# Patient Record
Sex: FEMALE | Race: WHITE | HISPANIC OR LATINO | Employment: FULL TIME | URBAN - METROPOLITAN AREA
[De-identification: names, ages, dates, MRNs, and addresses within clinical notes are randomized per-mention and may not be internally consistent; named-entity substitution may affect disease eponyms.]

---

## 2017-07-17 ENCOUNTER — OFFICE VISIT (OUTPATIENT)
Dept: URGENT CARE | Facility: CLINIC | Age: 32
End: 2017-07-17
Payer: COMMERCIAL

## 2017-07-17 VITALS
BODY MASS INDEX: 39.87 KG/M2 | DIASTOLIC BLOOD PRESSURE: 76 MMHG | HEIGHT: 63 IN | WEIGHT: 225 LBS | OXYGEN SATURATION: 97 % | HEART RATE: 80 BPM | TEMPERATURE: 98 F | SYSTOLIC BLOOD PRESSURE: 124 MMHG

## 2017-07-17 DIAGNOSIS — Z13.39 ALCOHOL SCREENING: ICD-10-CM

## 2017-07-17 DIAGNOSIS — S63.501A SPRAIN OF RIGHT WRIST, INITIAL ENCOUNTER: ICD-10-CM

## 2017-07-17 DIAGNOSIS — S63.641A SPRAIN OF METACARPOPHALANGEAL (MCP) JOINT OF RIGHT THUMB, INITIAL ENCOUNTER: ICD-10-CM

## 2017-07-17 DIAGNOSIS — Z02.83 ENCOUNTER FOR DRUG SCREENING: Primary | ICD-10-CM

## 2017-07-17 PROCEDURE — 82075 ASSAY OF BREATH ETHANOL: CPT | Mod: S$GLB,,, | Performed by: PREVENTIVE MEDICINE

## 2017-07-17 PROCEDURE — 99213 OFFICE O/P EST LOW 20 MIN: CPT | Mod: S$GLB,,, | Performed by: PREVENTIVE MEDICINE

## 2017-07-17 RX ORDER — MELOXICAM 7.5 MG/1
7.5 TABLET ORAL 2 TIMES DAILY WITH MEALS
Qty: 40 TABLET | Refills: 0 | Status: SHIPPED | OUTPATIENT
Start: 2017-07-17 | End: 2017-07-24 | Stop reason: ALTCHOICE

## 2017-07-17 NOTE — LETTER
[facility] [facility]  [address], [address]  [facility] [address]  Phone: [phone]  Fax: [phone]    Pt Name: Madalyn Graves  Injury Date: 7/17/2017   Employee ID: [id number] Date of First Treatment: 07/17/2017   Company: [employer]            Appointment Time: 04:05 PM Arrived:  4:20 PM CDT   Physician: Viral Clemons MD Check out: 5:42 PM           Office Treatment: Madalyn was seen today for arm pain and hand pain.    Diagnoses and all orders for this visit:    Encounter for drug screening  -     External Urine Drug Study Sendout    Alcohol screening  -     POCT alcohol breath test    Sprain of metacarpophalangeal (MCP) joint of right thumb, initial encounter    Sprain of right wrist, initial encounter    Other orders  -     meloxicam (MOBIC) 7.5 MG tablet; Take 1 tablet (7.5 mg total) by mouth 2 (two) times daily with meals.       Patient Instructions: Apply ice 24-48 hours then apply heat/warm soaks, Use splint as directed      WORK STATUS: No cutting, no mopping       Return Appointment: 7/24/2017 @ 3:00 pm

## 2017-07-17 NOTE — PROGRESS NOTES
Subjective:       Patient ID: Madalyn Graves is a 31 y.o. female.    Chief Complaint: Arm Pain (RIGHT) and Hand Pain (RIGHT)    New injury: doi/ft 7/17/2017: PT REPORTS A RECURRING RIGHT WRIST INJURY FROM LAST July. 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      Arm Pain      Hand Pain    The incident occurred more than 1 week ago. The incident occurred at work. The injury mechanism was repetitive motion. The pain is present in the right hand and right wrist. The pain is at a severity of 8/10.     ROS    Objective:      Physical Exam   Constitutional: She appears well-developed and well-nourished.   HENT:   Head: Normocephalic.   Neck: Normal range of motion.   Cardiovascular: Normal rate.    Musculoskeletal:        Right hand: She exhibits decreased range of motion and tenderness (Decreased range of motion about the right thumb with complaints of pain with flexion, extension  and abductions. No palpable nodules or swelling about the right thumb of wrist. ). She exhibits no swelling. Decreased strength noted. She exhibits thumb/finger opposition.   Neurological: She is alert.   Skin: Skin is warm.   Psychiatric: She has a normal mood and affect.       Assessment:       1. Encounter for drug screening    2. Alcohol screening    3. Sprain of metacarpophalangeal (MCP) joint of right thumb, initial encounter    4. Sprain of right wrist, initial encounter        Plan:            Patient Instructions: Apply ice 24-48 hours then apply heat/warm soaks, Use splint as directed        No cutting parts and no mopping at work

## 2017-07-24 ENCOUNTER — OFFICE VISIT (OUTPATIENT)
Dept: URGENT CARE | Facility: CLINIC | Age: 32
End: 2017-07-24
Payer: COMMERCIAL

## 2017-07-24 DIAGNOSIS — S63.501D SPRAIN OF RIGHT WRIST, SUBSEQUENT ENCOUNTER: Primary | ICD-10-CM

## 2017-07-24 DIAGNOSIS — S63.641D SPRAIN OF METACARPOPHALANGEAL (MCP) JOINT OF RIGHT THUMB, SUBSEQUENT ENCOUNTER: ICD-10-CM

## 2017-07-24 PROCEDURE — 99213 OFFICE O/P EST LOW 20 MIN: CPT | Mod: S$GLB,,, | Performed by: PREVENTIVE MEDICINE

## 2017-07-24 RX ORDER — ETODOLAC 400 MG/1
400 TABLET, FILM COATED ORAL 2 TIMES DAILY
Qty: 60 TABLET | Refills: 2 | Status: SHIPPED | OUTPATIENT
Start: 2017-07-24 | End: 2018-02-23

## 2017-07-24 RX ORDER — ETODOLAC 400 MG/1
400 TABLET, FILM COATED ORAL 2 TIMES DAILY
Qty: 60 TABLET | Refills: 2 | Status: SHIPPED | OUTPATIENT
Start: 2017-07-24 | End: 2017-07-24 | Stop reason: SDUPTHER

## 2017-07-24 NOTE — LETTER
[facility] [place]  [address], [address]  [place] [address]  Phone: [phone]  Fax: [phone]    Pt Name: Madalyn Graves  Injury Date: 7/17/2017   Employee ID:  Date: 07/24/2017   Company:[employer]            Appointment Time: 10:00 AM Arrived: 10:15 AM CDT   Physician: Viral Clemons MD Check out: 12:36 PM           Office Treatment: Madalyn was seen today for wrist pain and numbness.    Diagnoses and all orders for this visit:    Sprain of right wrist, subsequent encounter  -     X-Ray Wrist Complete Right; Future  -     Discontinue: etodolac (LODINE) 400 MG tablet; Take 1 tablet (400 mg total) by mouth 2 (two) times daily.  -     etodolac (LODINE) 400 MG tablet; Take 1 tablet (400 mg total) by mouth 2 (two) times daily.    Sprain of metacarpophalangeal (MCP) joint of right thumb, subsequent encounter       Patient Instructions: Daily home exercises/warm soaks, PT to be scheduled once authorized                                          WORK STATUS: LIGHT DUTY                                     No lifting/pushing/pulling more than 25 lbs            No cutting parts and limit use of right hand for lifting. Discontinue wrist splint         Return Appointment: 7/31/2017                                  @ 1:00 PM

## 2017-07-24 NOTE — ADDENDUM NOTE
Encounter addended by: Viral Clemons MD on: 7/24/2017  1:02 PM<BR>    Actions taken: Diagnosis association updated

## 2017-07-24 NOTE — PROGRESS NOTES
Subjective:       Patient ID: Madalyn Graves is a 31 y.o. female.    Chief Complaint: Wrist Pain (RIGHT) and Numbness    F/U (DOI 7/17/2017) PT RETURNS C/O CONSTANT RIGHT WRIST PAIN INCLUDING THE THUMB WITH HAND AND FINGER NUMBNESS.        Wrist Pain    The pain is present in the right wrist, right hand and right fingers. The problem occurs constantly. The problem has been unchanged. The quality of the pain is described as aching. The pain is at a severity of 9/10. The pain is moderate. Associated symptoms include numbness. Pertinent negatives include no fever, itching or stiffness. There is no history of gout.     Review of Systems   Constitution: Negative for chills, fever and weakness.   HENT: Negative for congestion, ear pain, headaches and nosebleeds.    Eyes: Negative for blurred vision and pain.   Cardiovascular: Negative for chest pain and palpitations.   Respiratory: Negative for cough, shortness of breath and wheezing.    Skin: Negative for dry skin, itching and rash.   Musculoskeletal: Positive for joint pain. Negative for arthritis, back pain, gout, joint swelling, muscle weakness, neck pain and stiffness.   Gastrointestinal: Negative for abdominal pain, constipation, diarrhea, nausea and vomiting.   Genitourinary: Negative for dysuria, frequency and hematuria.   Neurological: Positive for numbness. Negative for dizziness and seizures.   Allergic/Immunologic: Negative for hives.       Objective:      Physical Exam   Constitutional: She appears well-developed and well-nourished.   HENT:   Head: Normocephalic.   Eyes: EOM are normal.   Neck: Normal range of motion.   Cardiovascular: Normal rate.    Pulmonary/Chest: Effort normal.   Musculoskeletal:        Right wrist: She exhibits decreased range of motion and tenderness (patient complains of pain with palpation of dorsal aspect of right wrist. She has pain with flexion, extension and rotation of right wrist.  Phalen's and Tinnel's sign negative. ).    Neurological: She is alert.   Skin: Skin is warm.   Psychiatric: She has a normal mood and affect.       Assessment:       1. Sprain of right wrist, subsequent encounter    2. Sprain of metacarpophalangeal (MCP) joint of right thumb, subsequent encounter        Plan:           Medications Ordered This Encounter      etodolac (LODINE) 400 MG tablet          Sig: Take 1 tablet (400 mg total) by mouth 2 (two) times daily.          Dispense:  60 tablet          Refill:  2  Patient Instructions: Daily home exercises/warm soaks, PT to be scheduled once authorized   Restrictions: No lifting/pushing/pulling more than 25 lbs    No cutting parts and limit use of right hand for lifting. Discontinue wrist splint

## 2017-07-27 LAB — POC BREATH ALCOHOL: NEGATIVE

## 2017-07-31 ENCOUNTER — OFFICE VISIT (OUTPATIENT)
Dept: URGENT CARE | Facility: CLINIC | Age: 32
End: 2017-07-31
Payer: COMMERCIAL

## 2017-07-31 DIAGNOSIS — S63.501D SPRAIN OF RIGHT WRIST, SUBSEQUENT ENCOUNTER: Primary | ICD-10-CM

## 2017-07-31 DIAGNOSIS — S63.641D SPRAIN OF METACARPOPHALANGEAL (MCP) JOINT OF RIGHT THUMB, SUBSEQUENT ENCOUNTER: ICD-10-CM

## 2017-07-31 PROCEDURE — 99213 OFFICE O/P EST LOW 20 MIN: CPT | Mod: S$GLB,,, | Performed by: PREVENTIVE MEDICINE

## 2017-07-31 NOTE — ADDENDUM NOTE
Encounter addended by: Jorje Castillo MA on: 7/31/2017  2:14 PM<BR>    Actions taken: Letter status changed

## 2017-07-31 NOTE — LETTER
[facility]  [address], [address]  [facility] [address]  Phone: [phone]  Fax: [phone]    Pt Name: Madalyn Graves  Injury Date: 7/17/2017   Employee ID:  Today's Treatment: 07/31/2017   Company: [employer]            Appointment Time: 12:45 PM Arrived:  1:00 PM CDT   Physician: Viral Clemons MD Time Out: 13:55 pm           Office Treatment: Madalyn was seen today for wrist pain.    Diagnoses and all orders for this visit:    Sprain of right wrist, subsequent encounter    Sprain of metacarpophalangeal (MCP) joint of right thumb, subsequent encounter       Patient Instructions: Daily home exercises/warm soaks    Restrictions: No lifting/pushing/pulling more than 25 lbs (no cutting parts and limit use of right hand for lifiting.  Avoid work building in [place])       Return Appointment: 8/7/2017 at 1300 PM

## 2017-07-31 NOTE — LETTER
[facility] [place]  [address], [address]  [place] [address]  Phone: [phone]  Fax: [phone]    Pt Name: Madalyn Graves  Injury Date: 717/2017   Employee ID:  Datet: 7/31/2017   Company: [id number]            Appointment Time: 12:45 PM Arrived:  1:00 PM CDT   Physician: Viral Clemons MD            Office Treatment: Madalyn was seen today for wrist pain.    Diagnoses and all orders for this visit:    Sprain of right wrist, subsequent encounter    Sprain of metacarpophalangeal (MCP) joint of right thumb, subsequent encounter       Patient Instructions: Daily home exercises/warm soaks                                         WORK STATUS: LIGHT DUTY                             No lifting/pushing/pulling more than 25 lbs                       no cutting parts and limit use of right hand for lifiting.                                       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

## 2017-07-31 NOTE — PROGRESS NOTES
Subjective:       Patient ID: Madalyn Graves is a 31 y.o. female.    Chief Complaint: Wrist Pain    Follow up: Pt comes into today w/ pain in r/wrist from work related injury. Pt states that she has some swelling, numbness and tingling w/movement. cml      Wrist Pain    The pain is present in the right wrist. The current episode started in the past 7 days. The problem occurs constantly. The problem has been unchanged. The quality of the pain is described as aching. The pain is at a severity of 8/10. The pain is mild. Associated symptoms include joint swelling, numbness and tingling. The symptoms are aggravated by cold. She has tried NSAIDS and acetaminophen for the symptoms. The treatment provided no relief.     Review of Systems   Musculoskeletal: Positive for joint pain and joint swelling.   Neurological: Positive for numbness and tingling.   All other systems reviewed and are negative.      Objective:      Physical Exam   Constitutional: She appears well-developed and well-nourished.   HENT:   Head: Normocephalic.   Eyes: Pupils are equal, round, and reactive to light.   Neck: Normal range of motion.   Cardiovascular: Normal rate.    Pulmonary/Chest: Effort normal.   Musculoskeletal:        Right wrist: She exhibits decreased range of motion, tenderness and swelling. She exhibits no effusion, no crepitus, no deformity and no laceration.        Lumbar back: She exhibits no bony tenderness, no swelling, no edema, no deformity, no laceration, no spasm and normal pulse.        Arms:  Neurological: She is alert.   No focal neurologic deficits   Skin: Skin is warm.   Psychiatric: She has a normal mood and affect.   Nursing note and vitals reviewed.      Assessment:       1. Sprain of right wrist, subsequent encounter    2. Sprain of metacarpophalangeal (MCP) joint of right thumb, subsequent encounter        Plan:            Patient Instructions: Daily home exercises/warm soaks   Restrictions: No  lifting/pushing/pulling more than 25 lbs (no cutting parts and limit use of right hand for lifiting.  Avoid work building in [place])    Continue Etodolac 400mg Twice per day.

## 2017-08-07 ENCOUNTER — OFFICE VISIT (OUTPATIENT)
Dept: URGENT CARE | Facility: CLINIC | Age: 32
End: 2017-08-07
Payer: COMMERCIAL

## 2017-08-07 DIAGNOSIS — S63.501D SPRAIN OF RIGHT WRIST, SUBSEQUENT ENCOUNTER: Primary | ICD-10-CM

## 2017-08-07 DIAGNOSIS — S63.641D SPRAIN OF METACARPOPHALANGEAL (MCP) JOINT OF RIGHT THUMB, SUBSEQUENT ENCOUNTER: ICD-10-CM

## 2017-08-07 PROCEDURE — 3008F BODY MASS INDEX DOCD: CPT | Mod: S$GLB,,, | Performed by: PREVENTIVE MEDICINE

## 2017-08-07 PROCEDURE — 99213 OFFICE O/P EST LOW 20 MIN: CPT | Mod: S$GLB,,, | Performed by: PREVENTIVE MEDICINE

## 2017-08-07 NOTE — PROGRESS NOTES
Subjective:       Patient ID: Madalyn Graves is a 31 y.o. female.    Chief Complaint: Wrist Pain (RIGHT)    F/U RIGHT HAND WRIST PAIN: (DOI 7/17/17) PT STATES THAT HER PAIN IS A LITTLE BETTER. SHE CONTINUES TO C/O PAIN IN RIGHT THUMB/ HAND AREA.       Wrist Pain    The pain is present in the right hand and right wrist. The current episode started more than 1 month ago. The problem occurs constantly. The problem has been gradually improving. The quality of the pain is described as aching. The pain is at a severity of 5/10. The pain is mild. Pertinent negatives include no fever, itching, numbness or stiffness. The symptoms are aggravated by activity. She has tried acetaminophen and NSAIDS for the symptoms. The treatment provided mild relief. There is no history of gout.     Review of Systems   Constitution: Negative for chills, fever and weakness.   HENT: Negative for congestion, ear pain, headaches and nosebleeds.    Eyes: Negative for blurred vision and pain.   Cardiovascular: Negative for chest pain and palpitations.   Respiratory: Negative for cough, shortness of breath and wheezing.    Skin: Negative for dry skin, itching and rash.   Musculoskeletal: Positive for joint pain. Negative for arthritis, back pain, gout, joint swelling, muscle weakness, neck pain and stiffness.   Gastrointestinal: Negative for abdominal pain, constipation, diarrhea, nausea and vomiting.   Genitourinary: Negative for dysuria, frequency and hematuria.   Neurological: Negative for dizziness, numbness and seizures.   Allergic/Immunologic: Negative for hives.   All other systems reviewed and are negative.      Objective:      Physical Exam   Constitutional: She appears well-developed and well-nourished.   HENT:   Head: Normocephalic.   Eyes: Pupils are equal, round, and reactive to light.   Neck: Normal range of motion.   Cardiovascular: Normal rate.    Pulmonary/Chest: Effort normal.   Musculoskeletal:        Lumbar back: She  exhibits no bony tenderness, no swelling, no edema, no deformity, no laceration, no spasm and normal pulse.        Right hand: She exhibits decreased range of motion, tenderness and swelling. She exhibits no bony tenderness, normal capillary refill, no deformity and no laceration.        Hands:  Neurological: She is alert.   No focal neurologic deficits   Skin: Skin is warm.   Psychiatric: She has a normal mood and affect.   Nursing note and vitals reviewed.      Assessment:       1. Sprain of right wrist, subsequent encounter    2. Sprain of metacarpophalangeal (MCP) joint of right thumb, subsequent encounter        Plan:            Patient Instructions: Daily home exercises/warm soaks   Restrictions: No lifting/pushing/pulling more than 25 lbs (No cutting parts and limit use of right hand. Avoid using right hand to pinch in [place] area.  May work in Re-work areas.)

## 2017-08-07 NOTE — LETTER
[facility] [place]  [address], [address]  [facility] [address]  Phone: [phone]  Fax: [phone]    Pt Name: Madalyn Graves  Injury Date:    Employee ID:  Date 08/07/2017   Company: [employer]            Appointment Time: 01:15 PM Arrived:  1:30 PM CDT   Physician: Viral Clemons MD Check out: 3:01 PM           Office Treatment: Madalyn was seen today for wrist pain.    Diagnoses and all orders for this visit:    Sprain of right wrist, subsequent encounter    Sprain of metacarpophalangeal (MCP) joint of right thumb, subsequent encounter       Patient Instructions: Daily home exercises/warm soaks                                    WORK STATUS: LIGHT DUTY                              No lifting/pushing/pulling more than 25 lbs                               No cutting parts and limit use of right hand.                                Avoid using right hand to pinch in [place] area.                                              May work in Re-work areas.       Return Appointment: 8/14/2017 @ 11:30 AM

## 2017-08-14 ENCOUNTER — OFFICE VISIT (OUTPATIENT)
Dept: URGENT CARE | Facility: CLINIC | Age: 32
End: 2017-08-14
Payer: COMMERCIAL

## 2017-08-14 DIAGNOSIS — S63.501D SPRAIN OF RIGHT WRIST, SUBSEQUENT ENCOUNTER: Primary | ICD-10-CM

## 2017-08-14 DIAGNOSIS — S63.641D SPRAIN OF METACARPOPHALANGEAL (MCP) JOINT OF RIGHT THUMB, SUBSEQUENT ENCOUNTER: ICD-10-CM

## 2017-08-14 PROCEDURE — 3008F BODY MASS INDEX DOCD: CPT | Mod: S$GLB,,, | Performed by: PREVENTIVE MEDICINE

## 2017-08-14 PROCEDURE — 99213 OFFICE O/P EST LOW 20 MIN: CPT | Mod: S$GLB,,, | Performed by: PREVENTIVE MEDICINE

## 2017-08-14 NOTE — PROGRESS NOTES
Subjective:       Patient ID: Madalyn Graves is a 31 y.o. female.    Chief Complaint: Wrist Pain (RIGHT)    F/U RIGHT WRIST/HAND PAIN (DOI 7/17/2017)       Wrist Pain    The pain is present in the right wrist and right hand. The current episode started more than 1 month ago. The problem occurs constantly. The problem has been unchanged. The quality of the pain is described as aching. The pain is at a severity of 5/10. Pertinent negatives include no fever, itching, numbness or stiffness. The symptoms are aggravated by activity. There is no history of gout.     Review of Systems   Constitution: Negative for chills, fever and weakness.   HENT: Negative for congestion, ear pain, headaches and nosebleeds.    Eyes: Negative for blurred vision and pain.   Cardiovascular: Negative for chest pain and palpitations.   Respiratory: Negative for cough, shortness of breath and wheezing.    Skin: Negative for dry skin, itching and rash.   Musculoskeletal: Positive for joint pain and joint swelling. Negative for arthritis, back pain, gout, muscle weakness, neck pain and stiffness.   Gastrointestinal: Negative for abdominal pain, constipation, diarrhea, nausea and vomiting.   Genitourinary: Negative for dysuria, frequency and hematuria.   Neurological: Negative for dizziness, numbness and seizures.   Allergic/Immunologic: Negative for hives.   All other systems reviewed and are negative.      Objective:      Physical Exam   Constitutional: She appears well-developed and well-nourished.   HENT:   Head: Normocephalic.   Eyes: Pupils are equal, round, and reactive to light.   Neck: Normal range of motion.   Cardiovascular: Normal rate.    Pulmonary/Chest: Effort normal.   Musculoskeletal:        Lumbar back: She exhibits no bony tenderness, no swelling, no edema, no deformity, no laceration, no spasm and normal pulse.        Right hand: She exhibits decreased range of motion and tenderness. She exhibits no bony tenderness, normal  capillary refill, no deformity, no laceration and no swelling.        Hands:  Neurological: She is alert.   No focal neurologic deficits   Skin: Skin is warm.   Psychiatric: She has a normal mood and affect.   Nursing note and vitals reviewed.      Assessment:       1. Sprain of right wrist, subsequent encounter    2. Sprain of metacarpophalangeal (MCP) joint of right thumb, subsequent encounter        Plan:            Patient Instructions: Daily home exercises/warm soaks   Restrictions: No lifting/pushing/pulling more than 25 lbs (No cutting parts an limit use of right hand. Avoid using right hand to pinch in [place] area. May work in Re-work areas)

## 2017-08-28 ENCOUNTER — OFFICE VISIT (OUTPATIENT)
Dept: URGENT CARE | Facility: CLINIC | Age: 32
End: 2017-08-28
Payer: COMMERCIAL

## 2017-08-28 DIAGNOSIS — S63.641D SPRAIN OF METACARPOPHALANGEAL (MCP) JOINT OF RIGHT THUMB, SUBSEQUENT ENCOUNTER: ICD-10-CM

## 2017-08-28 DIAGNOSIS — S63.501D SPRAIN OF RIGHT WRIST, SUBSEQUENT ENCOUNTER: Primary | ICD-10-CM

## 2017-08-28 PROCEDURE — 99213 OFFICE O/P EST LOW 20 MIN: CPT | Mod: S$GLB,,, | Performed by: PREVENTIVE MEDICINE

## 2017-08-28 PROCEDURE — 3008F BODY MASS INDEX DOCD: CPT | Mod: S$GLB,,, | Performed by: PREVENTIVE MEDICINE

## 2017-08-28 NOTE — PROGRESS NOTES
Subjective:       Patient ID: Madalyn Graves is a 31 y.o. female.    Chief Complaint: Wrist Pain (right)    Pt presents for f/u on right wrist/hand pain (doi 7/17/2017)      Wrist Pain    The pain is present in the right hand and right wrist. The current episode started more than 1 month ago. The problem occurs intermittently. The problem has been gradually improving. The quality of the pain is described as aching. The pain is at a severity of 2/10. The pain is mild. Pertinent negatives include no fever, itching, numbness or stiffness. There is no history of gout.     Review of Systems   Constitution: Negative for chills, fever and weakness.   HENT: Negative for congestion, ear pain, headaches and nosebleeds.    Eyes: Negative for blurred vision and pain.   Cardiovascular: Negative for chest pain and palpitations.   Respiratory: Negative for cough, shortness of breath and wheezing.    Skin: Negative for dry skin, itching and rash.   Musculoskeletal: Positive for joint pain and joint swelling. Negative for arthritis, back pain, gout, muscle weakness, neck pain and stiffness.   Gastrointestinal: Negative for abdominal pain, constipation, diarrhea, nausea and vomiting.   Genitourinary: Negative for dysuria, frequency and hematuria.   Neurological: Negative for dizziness, numbness and seizures.   Allergic/Immunologic: Negative for hives.   All other systems reviewed and are negative.      Objective:      Physical Exam   Constitutional: She appears well-developed and well-nourished.   HENT:   Head: Normocephalic.   Eyes: Pupils are equal, round, and reactive to light.   Neck: Normal range of motion.   Cardiovascular: Normal rate.    Pulmonary/Chest: Effort normal.   Musculoskeletal:        Lumbar back: She exhibits no bony tenderness, no swelling, no edema, no deformity, no laceration, no spasm and normal pulse.        Right hand: She exhibits normal range of motion, no tenderness, no bony tenderness, normal  capillary refill, no deformity, no laceration and no swelling.        Hands:  Neurological: She is alert.   No focal neurologic deficits   Skin: Skin is warm.   Psychiatric: She has a normal mood and affect.   Nursing note and vitals reviewed.      Assessment:       1. Sprain of right wrist, subsequent encounter    2. Sprain of metacarpophalangeal (MCP) joint of right thumb, subsequent encounter        Plan:            Patient Instructions: Daily home exercises/warm soaks   Restrictions: Regular Duty (Permanent restriction of no Cutting)      Return if symptoms worsen or fail to improve.

## 2017-09-18 ENCOUNTER — OFFICE VISIT (OUTPATIENT)
Dept: URGENT CARE | Facility: CLINIC | Age: 32
End: 2017-09-18
Payer: COMMERCIAL

## 2017-09-18 DIAGNOSIS — S63.641D SPRAIN OF METACARPOPHALANGEAL (MCP) JOINT OF RIGHT THUMB, SUBSEQUENT ENCOUNTER: ICD-10-CM

## 2017-09-18 DIAGNOSIS — S63.501D SPRAIN OF RIGHT WRIST, SUBSEQUENT ENCOUNTER: Primary | ICD-10-CM

## 2017-09-18 PROCEDURE — 3008F BODY MASS INDEX DOCD: CPT | Mod: S$GLB,,, | Performed by: PREVENTIVE MEDICINE

## 2017-09-18 PROCEDURE — 99213 OFFICE O/P EST LOW 20 MIN: CPT | Mod: S$GLB,,, | Performed by: PREVENTIVE MEDICINE

## 2017-09-18 NOTE — LETTER
[facility] [place]  [address]  [facility] [facility] [address]  Phone: [phone]  Fax: [phone]    Pt Name: Madalyn Graves  Injury Date: 7/17/2017   Employee ID:  Date 09/18/2017   Company:[employer]            Appointment Time: 11:15 AM Arrived: 11:15 AM CDT   Physician: Viral Clemons MD cHECK OUT: 12:58 pm           Office Treatment: Madalyn was seen today for wrist pain.    Diagnoses and all orders for this visit:    Sprain of right wrist, subsequent encounter    Sprain of metacarpophalangeal (MCP) joint of right thumb, subsequent encounter       Patient Instructions: Daily home exercises/warm soaks    Restrictions: Regular Duty  Patient discharged from [facility].

## 2017-09-18 NOTE — PROGRESS NOTES
Subjective:       Patient ID: Madalyn Graves is a 31 y.o. female.    Chief Complaint: Wrist Pain (right)    [employer] employee treated for right wrist injury (7/17/2017), and released with a permanent job restriction 8/28. Per employer request, pt presents to discuss her job responsibilities as pertains to the MMI release to duty. Pt continues to have right wrist pain with gripping.  ajd      Wrist Pain    The pain is present in the right hand. The current episode started more than 1 month ago. The problem occurs intermittently. The quality of the pain is described as aching. The pain is mild. Pertinent negatives include no fever, itching, numbness or stiffness. There is no history of gout.     Review of Systems   Constitution: Negative for chills, fever and weakness.   HENT: Negative for congestion, ear pain and nosebleeds.    Eyes: Negative for blurred vision and pain.   Cardiovascular: Negative for chest pain and palpitations.   Respiratory: Negative for cough, shortness of breath and wheezing.    Skin: Negative for dry skin, itching and rash.   Musculoskeletal: Positive for joint pain. Negative for arthritis, back pain, gout, joint swelling, muscle weakness, neck pain and stiffness.   Gastrointestinal: Negative for abdominal pain, constipation, diarrhea, nausea and vomiting.   Genitourinary: Negative for dysuria, frequency and hematuria.   Neurological: Negative for dizziness, headaches, numbness and seizures.   Allergic/Immunologic: Negative for hives.   All other systems reviewed and are negative.      Objective:      Physical Exam   Constitutional: She appears well-developed and well-nourished.   HENT:   Head: Normocephalic.   Eyes: Pupils are equal, round, and reactive to light.   Neck: Normal range of motion.   Cardiovascular: Normal rate.    Pulmonary/Chest: Effort normal.   Musculoskeletal:        Right wrist: She exhibits normal range of motion, no tenderness, no bony tenderness, no swelling, no  effusion, no crepitus, no deformity and no laceration.        Lumbar back: She exhibits no bony tenderness, no swelling, no edema, no deformity, no laceration, no spasm and normal pulse.   Neurological: She is alert.   No focal neurologic deficits   Skin: Skin is warm.   Psychiatric: She has a normal mood and affect.   Nursing note and vitals reviewed.      Assessment:       1. Sprain of right wrist, subsequent encounter    2. Sprain of metacarpophalangeal (MCP) joint of right thumb, subsequent encounter        Plan:            Patient Instructions: Daily home exercises/warm soaks   Restrictions: Regular Duty  Return if symptoms worsen or fail to improve.

## 2018-02-23 ENCOUNTER — OFFICE VISIT (OUTPATIENT)
Dept: FAMILY MEDICINE | Facility: CLINIC | Age: 33
End: 2018-02-23
Payer: COMMERCIAL

## 2018-02-23 VITALS
SYSTOLIC BLOOD PRESSURE: 116 MMHG | HEIGHT: 63 IN | RESPIRATION RATE: 12 BRPM | DIASTOLIC BLOOD PRESSURE: 78 MMHG | BODY MASS INDEX: 39.16 KG/M2 | HEART RATE: 85 BPM | OXYGEN SATURATION: 97 % | WEIGHT: 221 LBS | TEMPERATURE: 98 F

## 2018-02-23 DIAGNOSIS — R73.03 PREDIABETES: Primary | ICD-10-CM

## 2018-02-23 DIAGNOSIS — Z23 NEED FOR VACCINATION: ICD-10-CM

## 2018-02-23 DIAGNOSIS — E66.9 OBESITY, CLASS II, BMI 35-39.9: ICD-10-CM

## 2018-02-23 DIAGNOSIS — E66.01 MORBID OBESITY: ICD-10-CM

## 2018-02-23 PROCEDURE — 90471 IMMUNIZATION ADMIN: CPT | Mod: S$GLB,,, | Performed by: FAMILY MEDICINE

## 2018-02-23 PROCEDURE — 90715 TDAP VACCINE 7 YRS/> IM: CPT | Mod: S$GLB,,, | Performed by: FAMILY MEDICINE

## 2018-02-23 PROCEDURE — 99203 OFFICE O/P NEW LOW 30 MIN: CPT | Mod: 25,S$GLB,, | Performed by: FAMILY MEDICINE

## 2018-02-23 PROCEDURE — 99999 PR PBB SHADOW E&M-EST. PATIENT-LVL III: CPT | Mod: PBBFAC,,, | Performed by: FAMILY MEDICINE

## 2018-02-23 PROCEDURE — 3008F BODY MASS INDEX DOCD: CPT | Mod: S$GLB,,, | Performed by: FAMILY MEDICINE

## 2018-02-23 RX ORDER — METFORMIN HYDROCHLORIDE 500 MG/1
500 TABLET ORAL 2 TIMES DAILY WITH MEALS
Qty: 60 TABLET | Refills: 2 | Status: SHIPPED | OUTPATIENT
Start: 2018-02-23 | End: 2018-04-06 | Stop reason: ALTCHOICE

## 2018-02-23 NOTE — PROGRESS NOTES
"Subjective:       Patient ID: Madalyn Graves is a 32 y.o. female.    Chief Complaint: Establish Care    HPI   32 year old female comes in for review of abnormal labs and establish care for prediabetes. She had fasting labs done earlier today showing a glucose of 115, Tchol of 167, LDL of 103, and HDL31, with TAG of 164. Patient is overweight.  No other medical problems reported.     Review of Systems   Constitutional: Negative for unexpected weight change.   HENT: Negative for ear pain and sore throat.    Eyes: Negative for visual disturbance.   Respiratory: Negative for shortness of breath.    Cardiovascular: Negative for chest pain.   Gastrointestinal: Negative for abdominal pain and blood in stool.   Endocrine: Negative for cold intolerance and heat intolerance.   Genitourinary: Negative for dysuria and frequency.   Skin: Negative for rash.   Neurological: Negative for weakness, numbness and headaches.   Hematological: Negative for adenopathy.   Psychiatric/Behavioral: Negative for suicidal ideas.       Objective:     /78 (BP Location: Right arm, Patient Position: Sitting, BP Method: Medium (Manual))   Pulse 85   Temp 98.1 °F (36.7 °C) (Oral)   Resp 12   Ht 5' 3" (1.6 m)   Wt 100.2 kg (221 lb)   LMP 01/23/2018 (Approximate) Comment: BTL  SpO2 97%   BMI 39.15 kg/m²     Physical Exam   Constitutional: She is oriented to person, place, and time.   HENT:   Head: Normocephalic and atraumatic.   Right Ear: Tympanic membrane, external ear and ear canal normal.   Left Ear: Tympanic membrane, external ear and ear canal normal.   Nose: Nose normal.   Mouth/Throat: Oropharynx is clear and moist.   Eyes: Conjunctivae and EOM are normal. Pupils are equal, round, and reactive to light.   Neck: Normal range of motion. Neck supple.   Cardiovascular: Normal rate, regular rhythm, normal heart sounds and intact distal pulses.    Pulmonary/Chest: Effort normal and breath sounds normal.   Abdominal: Soft. Bowel " sounds are normal.   Neurological: She is alert and oriented to person, place, and time. No cranial nerve deficit.   Psychiatric: She has a normal mood and affect.   Vitals reviewed.      Assessment:       1. Prediabetes    2. Need for vaccination    3. Need for Tdap vaccination    4. Morbid obesity    5. Obesity, Class II, BMI 35-39.9    6. BMI 39.0-39.9,adult        Plan:     Madalyn was seen today for establish care.    Diagnoses and all orders for this visit:    Prediabetes  -     metFORMIN (GLUCOPHAGE) 500 MG tablet; Take 1 tablet (500 mg total) by mouth 2 (two) times daily with meals.  -     Hemoglobin A1c; Future  Discussed implications of prediabetes and risks of developing diabetes.  Dietary changes discussed.  Risks, benefits and side effects of metformin discussed and patient agrees to start.  Patient will work on weight.  Follow up in 6 weeks.     Need for vaccination  -     (In Office Administered) Tdap Vaccine    Morbid obesity    Obesity, Class II, BMI 35-39.9    BMI 39.0-39.9,adult

## 2018-02-23 NOTE — PROGRESS NOTES
Tdap was given to the pt as ordered by the MD. The patient tolerated well, I advised the patient to wait 15 minuets to observe for any vaccine reactions. The pt. Expressed an understanding.

## 2018-02-28 ENCOUNTER — LAB VISIT (OUTPATIENT)
Dept: LAB | Facility: HOSPITAL | Age: 33
End: 2018-02-28
Attending: FAMILY MEDICINE
Payer: COMMERCIAL

## 2018-02-28 DIAGNOSIS — R73.03 PREDIABETES: ICD-10-CM

## 2018-02-28 LAB
ESTIMATED AVG GLUCOSE: 111 MG/DL
HBA1C MFR BLD HPLC: 5.5 %

## 2018-02-28 PROCEDURE — 36415 COLL VENOUS BLD VENIPUNCTURE: CPT | Mod: PN

## 2018-02-28 PROCEDURE — 83036 HEMOGLOBIN GLYCOSYLATED A1C: CPT

## 2018-04-06 ENCOUNTER — OFFICE VISIT (OUTPATIENT)
Dept: FAMILY MEDICINE | Facility: CLINIC | Age: 33
End: 2018-04-06
Payer: COMMERCIAL

## 2018-04-06 VITALS
WEIGHT: 216.94 LBS | BODY MASS INDEX: 38.44 KG/M2 | HEIGHT: 63 IN | HEART RATE: 84 BPM | OXYGEN SATURATION: 96 % | SYSTOLIC BLOOD PRESSURE: 118 MMHG | TEMPERATURE: 98 F | DIASTOLIC BLOOD PRESSURE: 80 MMHG

## 2018-04-06 DIAGNOSIS — R73.03 PREDIABETES: Primary | ICD-10-CM

## 2018-04-06 DIAGNOSIS — E66.9 OBESITY (BMI 35.0-39.9 WITHOUT COMORBIDITY): ICD-10-CM

## 2018-04-06 PROCEDURE — 99214 OFFICE O/P EST MOD 30 MIN: CPT | Mod: S$GLB,,, | Performed by: FAMILY MEDICINE

## 2018-04-06 PROCEDURE — 99999 PR PBB SHADOW E&M-EST. PATIENT-LVL III: CPT | Mod: PBBFAC,,, | Performed by: FAMILY MEDICINE

## 2018-04-07 NOTE — PROGRESS NOTES
Routine Office Visit    Patient Name: Madalyn Graves    : 1985  MRN: [id number]    Subjective:  Madalyn is a 32 y.o. female who presents today for:   Chief Complaint   Patient presents with    PreDM       32 year old female presents For follow up on prediabetes. She reports that she's taking the metformin at night time. She reports that he gives her diarrhea.She continues taking despite the side effect. She's also decreasing her carb intake. She has stopped drinking soda. She is also exercising more. She's trying to lose weight and since her last visit has lost 5 pounds.     Past Medical History  Past Medical History:   Diagnosis Date    Asthma        Past Surgical History  Past Surgical History:   Procedure Laterality Date     SECTION      X2    TUBAL LIGATION         Family History  Family History   Problem Relation Age of Onset    Diabetes Mother     Diabetes Father     Breast cancer Neg Hx     Colon cancer Neg Hx     Ovarian cancer Neg Hx        Social History  Social History     Social History    Marital status:      Spouse name: N/A    Number of children: N/A    Years of education: N/A     Occupational History    Not on file.     Social History Main Topics    Smoking status: Current Some Day Smoker    Smokeless tobacco: Never Used      Comment: maybe 2-3 cigarettes a day    Alcohol use Yes      Comment: socilally    Drug use: No    Sexual activity: Not on file     Other Topics Concern    Not on file     Social History Narrative    No narrative on file       Current Medications  No current outpatient prescriptions on file prior to visit.     No current facility-administered medications on file prior to visit.        Allergies   Review of patient's allergies indicates:   Allergen Reactions    Penicillins Other (See Comments)     Passed out       Review of Systems   Respiratory: Negative for shortness of breath.    Cardiovascular: Negative for chest pain.  "  Gastrointestinal: Negative for abdominal pain and blood in stool.   Endocrine: Negative for cold intolerance, heat intolerance, polydipsia, polyphagia and polyuria.   Genitourinary: Negative for dysuria and frequency.   Skin: Negative for rash.   Neurological: Negative for weakness, numbness and headaches.   Hematological: Negative for adenopathy.   Psychiatric/Behavioral: Negative for suicidal ideas.       /80   Pulse 84   Temp 98.4 °F (36.9 °C) (Oral)   Ht 5' 3" (1.6 m)   Wt 98.4 kg (216 lb 14.9 oz)   LMP 03/20/2018   SpO2 96%   BMI 38.43 kg/m²     Physical Exam   Constitutional: She is oriented to person, place, and time. She appears well-developed and well-nourished. No distress.   HENT:   Head: Normocephalic and atraumatic.   Right Ear: Tympanic membrane, external ear and ear canal normal.   Left Ear: Tympanic membrane, external ear and ear canal normal.   Nose: Nose normal.   Mouth/Throat: Oropharynx is clear and moist.   Eyes: Conjunctivae and EOM are normal. Pupils are equal, round, and reactive to light. Right eye exhibits no discharge. Left eye exhibits no discharge.   Neck: Normal range of motion. Neck supple. No tracheal deviation present. No thyromegaly present.   Cardiovascular: Normal rate, regular rhythm, normal heart sounds and intact distal pulses.    No murmur heard.  Pulmonary/Chest: Effort normal and breath sounds normal. No respiratory distress. She has no wheezes. She has no rales.   Abdominal: Soft. Bowel sounds are normal. She exhibits no distension and no mass. There is no tenderness.   Lymphadenopathy:     She has no cervical adenopathy.   Neurological: She is alert and oriented to person, place, and time. No cranial nerve deficit.   Reflex Scores:       Patellar reflexes are 2+ on the right side and 2+ on the left side.  Skin: Skin is warm and dry. Capillary refill takes less than 2 seconds. No rash noted. She is not diaphoretic.   Psychiatric: She has a normal mood and " affect. Her behavior is normal.   Vitals reviewed.    Lab Visit on 02/28/2018   Component Date Value Ref Range Status    Hemoglobin A1C 02/28/2018 5.5  4.0 - 5.6 % Final    Comment: According to ADA guidelines, hemoglobin A1c <7.0% represents  optimal control in non-pregnant diabetic patients. Different  metrics may apply to specific patient populations.   Standards of Medical Care in Diabetes-2016.  For the purpose of screening for the presence of diabetes:  <5.7%     Consistent with the absence of diabetes  5.7-6.4%  Consistent with increasing risk for diabetes   (prediabetes)  >or=6.5%  Consistent with diabetes  Currently, no consensus exists for use of hemoglobin A1c  for diagnosis of diabetes for children.  This Hemoglobin A1c assay has significant interference with fetal   hemoglobin   (HbF). The results are invalid for patients with abnormal amounts of   HbF,   including those with known Hereditary Persistence   of Fetal Hemoglobin. Heterozygous hemoglobin variants (HbAS, HbAC,   HbAD, HbAE, HbA2) do not significantly interfere with this assay;   however, presence of multiple variants in a sample may impact the %   interference.      Estimated Avg Glucose 02/28/2018 111  68 - 131 mg/dL Final       Assessment/Plan:  Madalyn was seen today for prediabetes    Diagnoses and all orders for this visit:    Prediabetes  -     Hemoglobin A1c; Future  Patient's  A1C is much improved, and not in prediabetic range.She will stop her metformin.Will recheck an A1 C in three months.If continues to be in normal range will recheck in 6 to 12 months. She was encouraged to continue making good diet choices and continue exercising.    Obesity (BMI 35.0-39.9 without comorbidity)  Patient wasn't cursed continue exercising and making good dietary choices. Weight loss was continued to be encouraged. She was congratulated on the weight loss so far.

## 2019-03-28 ENCOUNTER — HOSPITAL ENCOUNTER (EMERGENCY)
Dept: HOSPITAL 97 - ER | Age: 34
Discharge: HOME | End: 2019-03-28
Payer: SELF-PAY

## 2019-03-28 DIAGNOSIS — Z88.0: ICD-10-CM

## 2019-03-28 DIAGNOSIS — J45.909: ICD-10-CM

## 2019-03-28 DIAGNOSIS — J10.1: Primary | ICD-10-CM

## 2019-03-28 PROCEDURE — 71046 X-RAY EXAM CHEST 2 VIEWS: CPT

## 2019-03-28 PROCEDURE — 87804 INFLUENZA ASSAY W/OPTIC: CPT

## 2019-03-28 PROCEDURE — 99284 EMERGENCY DEPT VISIT MOD MDM: CPT

## 2019-06-26 ENCOUNTER — HOSPITAL ENCOUNTER (EMERGENCY)
Dept: HOSPITAL 97 - ER | Age: 34
Discharge: HOME | End: 2019-06-26
Payer: SELF-PAY

## 2019-06-26 DIAGNOSIS — J45.901: ICD-10-CM

## 2019-06-26 DIAGNOSIS — H65.03: Primary | ICD-10-CM

## 2019-06-26 DIAGNOSIS — Z88.0: ICD-10-CM

## 2019-06-26 PROCEDURE — 99283 EMERGENCY DEPT VISIT LOW MDM: CPT

## 2020-01-26 ENCOUNTER — HOSPITAL ENCOUNTER (EMERGENCY)
Dept: HOSPITAL 97 - ER | Age: 35
Discharge: HOME | End: 2020-01-26
Payer: SELF-PAY

## 2020-01-26 VITALS — OXYGEN SATURATION: 97 % | SYSTOLIC BLOOD PRESSURE: 101 MMHG | TEMPERATURE: 98.4 F | DIASTOLIC BLOOD PRESSURE: 61 MMHG

## 2020-01-26 DIAGNOSIS — I10: Primary | ICD-10-CM

## 2020-01-26 DIAGNOSIS — Z88.0: ICD-10-CM

## 2020-01-26 DIAGNOSIS — R51: ICD-10-CM

## 2020-01-26 PROCEDURE — 70450 CT HEAD/BRAIN W/O DYE: CPT

## 2020-01-26 PROCEDURE — 99284 EMERGENCY DEPT VISIT MOD MDM: CPT

## 2020-01-26 PROCEDURE — 96375 TX/PRO/DX INJ NEW DRUG ADDON: CPT

## 2020-01-26 PROCEDURE — 96361 HYDRATE IV INFUSION ADD-ON: CPT

## 2020-01-26 PROCEDURE — 96374 THER/PROPH/DIAG INJ IV PUSH: CPT

## 2021-06-20 ENCOUNTER — HOSPITAL ENCOUNTER (EMERGENCY)
Dept: HOSPITAL 97 - ER | Age: 36
Discharge: HOME | End: 2021-06-20
Payer: SELF-PAY

## 2021-06-20 VITALS — SYSTOLIC BLOOD PRESSURE: 129 MMHG | OXYGEN SATURATION: 100 % | DIASTOLIC BLOOD PRESSURE: 85 MMHG

## 2021-06-20 VITALS — TEMPERATURE: 97 F

## 2021-06-20 DIAGNOSIS — Z72.0: ICD-10-CM

## 2021-06-20 DIAGNOSIS — M54.5: Primary | ICD-10-CM

## 2021-06-20 DIAGNOSIS — Z88.0: ICD-10-CM

## 2021-06-20 LAB — SP GR UR: >1.03 (ref 1–1.03)

## 2021-06-20 PROCEDURE — 99284 EMERGENCY DEPT VISIT MOD MDM: CPT

## 2021-06-20 PROCEDURE — 81003 URINALYSIS AUTO W/O SCOPE: CPT

## 2021-06-20 PROCEDURE — 96372 THER/PROPH/DIAG INJ SC/IM: CPT

## 2021-06-20 PROCEDURE — 81025 URINE PREGNANCY TEST: CPT

## 2021-06-20 PROCEDURE — 72100 X-RAY EXAM L-S SPINE 2/3 VWS: CPT

## 2022-03-13 ENCOUNTER — HOSPITAL ENCOUNTER (EMERGENCY)
Dept: HOSPITAL 97 - ER | Age: 37
Discharge: HOME | End: 2022-03-13
Payer: COMMERCIAL

## 2022-03-13 VITALS — SYSTOLIC BLOOD PRESSURE: 128 MMHG | OXYGEN SATURATION: 98 % | DIASTOLIC BLOOD PRESSURE: 83 MMHG

## 2022-03-13 VITALS — TEMPERATURE: 98.2 F

## 2022-03-13 DIAGNOSIS — F17.210: ICD-10-CM

## 2022-03-13 DIAGNOSIS — Z88.0: ICD-10-CM

## 2022-03-13 DIAGNOSIS — H00.021: Primary | ICD-10-CM

## 2022-03-13 PROCEDURE — 99282 EMERGENCY DEPT VISIT SF MDM: CPT

## 2022-04-20 ENCOUNTER — HOSPITAL ENCOUNTER (EMERGENCY)
Dept: HOSPITAL 97 - ER | Age: 37
Discharge: HOME | End: 2022-04-20
Payer: COMMERCIAL

## 2022-04-20 VITALS — OXYGEN SATURATION: 100 % | TEMPERATURE: 97.4 F

## 2022-04-20 VITALS — DIASTOLIC BLOOD PRESSURE: 84 MMHG | SYSTOLIC BLOOD PRESSURE: 127 MMHG

## 2022-04-20 DIAGNOSIS — Z88.0: ICD-10-CM

## 2022-04-20 DIAGNOSIS — N39.0: Primary | ICD-10-CM

## 2022-04-20 DIAGNOSIS — R73.9: ICD-10-CM

## 2022-04-20 LAB
ALBUMIN SERPL BCP-MCNC: 3.4 G/DL (ref 3.4–5)
ALP SERPL-CCNC: 88 U/L (ref 45–117)
ALT SERPL W P-5'-P-CCNC: 50 U/L (ref 12–78)
AST SERPL W P-5'-P-CCNC: 22 U/L (ref 15–37)
BUN BLD-MCNC: 19 MG/DL (ref 7–18)
GLUCOSE SERPLBLD-MCNC: 177 MG/DL (ref 74–106)
HCT VFR BLD CALC: 42.4 % (ref 36–45)
LIPASE SERPL-CCNC: 77 U/L (ref 73–393)
LYMPHOCYTES # SPEC AUTO: 3.1 K/UL (ref 0.7–4.9)
PMV BLD: 9.3 FL (ref 7.6–11.3)
POTASSIUM SERPL-SCNC: 4.1 MMOL/L (ref 3.5–5.1)
RBC # BLD: 4.76 M/UL (ref 3.86–4.86)

## 2022-04-20 PROCEDURE — 99284 EMERGENCY DEPT VISIT MOD MDM: CPT

## 2022-04-20 PROCEDURE — 36415 COLL VENOUS BLD VENIPUNCTURE: CPT

## 2022-04-20 PROCEDURE — 87086 URINE CULTURE/COLONY COUNT: CPT

## 2022-04-20 PROCEDURE — 85025 COMPLETE CBC W/AUTO DIFF WBC: CPT

## 2022-04-20 PROCEDURE — 96375 TX/PRO/DX INJ NEW DRUG ADDON: CPT

## 2022-04-20 PROCEDURE — 81015 MICROSCOPIC EXAM OF URINE: CPT

## 2022-04-20 PROCEDURE — 83690 ASSAY OF LIPASE: CPT

## 2022-04-20 PROCEDURE — 74176 CT ABD & PELVIS W/O CONTRAST: CPT

## 2022-04-20 PROCEDURE — 87088 URINE BACTERIA CULTURE: CPT

## 2022-04-20 PROCEDURE — 96374 THER/PROPH/DIAG INJ IV PUSH: CPT

## 2022-04-20 PROCEDURE — 81003 URINALYSIS AUTO W/O SCOPE: CPT

## 2022-04-20 PROCEDURE — 80053 COMPREHEN METABOLIC PANEL: CPT

## 2022-05-27 ENCOUNTER — HOSPITAL ENCOUNTER (EMERGENCY)
Dept: HOSPITAL 97 - ER | Age: 37
Discharge: HOME | End: 2022-05-27
Payer: COMMERCIAL

## 2022-05-27 VITALS — TEMPERATURE: 98.4 F | SYSTOLIC BLOOD PRESSURE: 134 MMHG | OXYGEN SATURATION: 98 % | DIASTOLIC BLOOD PRESSURE: 87 MMHG

## 2022-05-27 DIAGNOSIS — R11.0: ICD-10-CM

## 2022-05-27 DIAGNOSIS — Z88.0: ICD-10-CM

## 2022-05-27 DIAGNOSIS — R42: Primary | ICD-10-CM

## 2022-05-27 LAB
BUN BLD-MCNC: 10 MG/DL (ref 7–18)
GLUCOSE SERPLBLD-MCNC: 184 MG/DL (ref 74–106)
HCT VFR BLD CALC: 43.8 % (ref 36–45)
LYMPHOCYTES # SPEC AUTO: 2 K/UL (ref 0.7–4.9)
PMV BLD: 8.7 FL (ref 7.6–11.3)
POTASSIUM SERPL-SCNC: 4.1 MMOL/L (ref 3.5–5.1)
RBC # BLD: 4.87 M/UL (ref 3.86–4.86)
TROPONIN I SERPL HS-MCNC: 3.1 PG/ML (ref ?–58.9)

## 2022-05-27 PROCEDURE — 85025 COMPLETE CBC W/AUTO DIFF WBC: CPT

## 2022-05-27 PROCEDURE — 36415 COLL VENOUS BLD VENIPUNCTURE: CPT

## 2022-05-27 PROCEDURE — 70450 CT HEAD/BRAIN W/O DYE: CPT

## 2022-05-27 PROCEDURE — 93005 ELECTROCARDIOGRAM TRACING: CPT

## 2022-05-27 PROCEDURE — 80048 BASIC METABOLIC PNL TOTAL CA: CPT

## 2022-05-27 PROCEDURE — 84484 ASSAY OF TROPONIN QUANT: CPT

## 2022-05-27 PROCEDURE — 96374 THER/PROPH/DIAG INJ IV PUSH: CPT

## 2022-05-27 PROCEDURE — 99284 EMERGENCY DEPT VISIT MOD MDM: CPT

## 2022-05-27 PROCEDURE — 96375 TX/PRO/DX INJ NEW DRUG ADDON: CPT

## 2022-07-07 ENCOUNTER — HOSPITAL ENCOUNTER (EMERGENCY)
Dept: HOSPITAL 97 - ER | Age: 37
Discharge: HOME | End: 2022-07-07
Payer: COMMERCIAL

## 2022-07-07 VITALS — TEMPERATURE: 98.3 F | SYSTOLIC BLOOD PRESSURE: 137 MMHG | DIASTOLIC BLOOD PRESSURE: 87 MMHG

## 2022-07-07 VITALS — OXYGEN SATURATION: 99 %

## 2022-07-07 DIAGNOSIS — Z88.0: ICD-10-CM

## 2022-07-07 DIAGNOSIS — S93.402A: Primary | ICD-10-CM

## 2022-08-13 ENCOUNTER — HOSPITAL ENCOUNTER (EMERGENCY)
Dept: HOSPITAL 97 - ER | Age: 37
Discharge: HOME | End: 2022-08-13
Payer: COMMERCIAL

## 2022-08-13 VITALS — OXYGEN SATURATION: 100 % | TEMPERATURE: 98.1 F | DIASTOLIC BLOOD PRESSURE: 99 MMHG | SYSTOLIC BLOOD PRESSURE: 145 MMHG

## 2022-08-13 DIAGNOSIS — E11.9: ICD-10-CM

## 2022-08-13 DIAGNOSIS — N39.0: Primary | ICD-10-CM

## 2022-08-13 PROCEDURE — 87088 URINE BACTERIA CULTURE: CPT

## 2022-08-13 PROCEDURE — 87086 URINE CULTURE/COLONY COUNT: CPT

## 2022-08-13 PROCEDURE — 99282 EMERGENCY DEPT VISIT SF MDM: CPT

## 2022-08-13 PROCEDURE — 81003 URINALYSIS AUTO W/O SCOPE: CPT

## 2022-08-13 PROCEDURE — 81015 MICROSCOPIC EXAM OF URINE: CPT

## 2023-02-13 ENCOUNTER — HOSPITAL ENCOUNTER (EMERGENCY)
Dept: HOSPITAL 97 - ER | Age: 38
LOS: 1 days | Discharge: HOME | End: 2023-02-14
Payer: COMMERCIAL

## 2023-02-13 DIAGNOSIS — Z88.0: ICD-10-CM

## 2023-02-13 DIAGNOSIS — R51.9: Primary | ICD-10-CM

## 2023-02-13 DIAGNOSIS — R11.0: ICD-10-CM

## 2023-02-13 DIAGNOSIS — E11.9: ICD-10-CM

## 2023-02-13 DIAGNOSIS — F17.210: ICD-10-CM

## 2023-02-14 VITALS — TEMPERATURE: 98.2 F | DIASTOLIC BLOOD PRESSURE: 81 MMHG | SYSTOLIC BLOOD PRESSURE: 133 MMHG | OXYGEN SATURATION: 100 %

## 2023-10-11 NOTE — LETTER
[facility]  [address], [address]  [place] [address]  Phone: [phone]  Fax: [phone]    Pt Name: Madalyn Graves  Injury Date: 7/17/2017   Employee ID:  Date  08/14/2017   Company: [employer]            Appointment Time: 11:15 AM Arrived: 11:30 AM CDT   Physician: Viral Clemons MD            Office Treatment: Madalyn was seen today for wrist pain.    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 Avoid using right hand to pinch in Sonic area. May work in Re-work areas)       Return Appointment: 8/28/2017@ 10:00 AM        98.4

## 2024-05-04 ENCOUNTER — HOSPITAL ENCOUNTER (EMERGENCY)
Dept: HOSPITAL 97 - ER | Age: 39
Discharge: HOME | End: 2024-05-04
Payer: SELF-PAY

## 2024-05-04 VITALS — DIASTOLIC BLOOD PRESSURE: 72 MMHG | OXYGEN SATURATION: 97 % | SYSTOLIC BLOOD PRESSURE: 121 MMHG | TEMPERATURE: 98 F

## 2024-05-04 DIAGNOSIS — L50.9: Primary | ICD-10-CM

## 2024-05-04 PROCEDURE — 99283 EMERGENCY DEPT VISIT LOW MDM: CPT
